# Patient Record
Sex: FEMALE | Race: OTHER | RURAL
[De-identification: names, ages, dates, MRNs, and addresses within clinical notes are randomized per-mention and may not be internally consistent; named-entity substitution may affect disease eponyms.]

---

## 2022-10-28 NOTE — PATIENT DISCUSSION
Discussed with patient the etiology of the hemorrhage and that this is a totally benign condition. It can be associated with blood thinners, but s/he should not stop their medication. It will resolve spontaneously within a week or two and no treatment is indicated. Ok to use artificial tears for comfort.

## 2024-08-07 ENCOUNTER — NEW PATIENT (OUTPATIENT)
Dept: RURAL CLINIC 1 | Facility: CLINIC | Age: 29
End: 2024-08-07

## 2024-08-07 DIAGNOSIS — H52.13: ICD-10-CM

## 2024-08-07 PROCEDURE — S0620AEC ROUTINE OPH EXAM INCLUDES REF/ NEW PATIENT

## 2024-08-07 ASSESSMENT — VISUAL ACUITY
OU_CC: 20/20
OU_CC: 20/20
OD_CC: 20/20
OS_CC: 20/30
OS_CC: 20/20
OD_CC: 20/40

## 2024-08-07 ASSESSMENT — TONOMETRY
OS_IOP_MMHG: 14
OD_IOP_MMHG: 14